# Patient Record
Sex: MALE
[De-identification: names, ages, dates, MRNs, and addresses within clinical notes are randomized per-mention and may not be internally consistent; named-entity substitution may affect disease eponyms.]

---

## 2023-06-12 ENCOUNTER — APPOINTMENT (OUTPATIENT)
Dept: UROLOGY | Facility: CLINIC | Age: 33
End: 2023-06-12
Payer: COMMERCIAL

## 2023-06-12 VITALS
SYSTOLIC BLOOD PRESSURE: 122 MMHG | OXYGEN SATURATION: 95 % | BODY MASS INDEX: 35.89 KG/M2 | WEIGHT: 265 LBS | HEART RATE: 88 BPM | TEMPERATURE: 98.4 F | DIASTOLIC BLOOD PRESSURE: 85 MMHG | RESPIRATION RATE: 16 BRPM | HEIGHT: 72 IN

## 2023-06-12 DIAGNOSIS — N50.811 RIGHT TESTICULAR PAIN: ICD-10-CM

## 2023-06-12 PROBLEM — Z00.00 ENCOUNTER FOR PREVENTIVE HEALTH EXAMINATION: Status: ACTIVE | Noted: 2023-06-12

## 2023-06-12 PROCEDURE — 99204 OFFICE O/P NEW MOD 45 MIN: CPT

## 2023-06-12 NOTE — HISTORY OF PRESENT ILLNESS
[FreeTextEntry1] : Language: English\par Date of First visit: 06/12/2023 \par Accompanied by: self\par Contact info: \par Referring Provider/PCP: Dr. Mirna Ortiz ([facility])\par Fax: [phone]\par \par \par CC/ Problem List:\par right testicle discomfort\par \par ===============================================================================\par FIRST VISIT / Summary:\par Very pleasant 32 year old M here for mild discomfort to Right testicle that began about 2-3 months ago. He feels as though he has been hit. Rates 4/10. No alleviating factors. Sexual relations exacerbate. Feels small lump to right side not sure how its been present. No imaging performed. \par \par Denies dysuria, hematuria, discharge, pain with heavy lifting, or change in urination\par \par -------------------------------------------------------------------------------------------\par INTERVAL VISITS:\par \par ===============================================================================\par \par PMH: mild HLD no medications\par Meds: denies\par All: Seasonal allergies. Denies food or medication\par FHx: paternal grandfather w/melanoma; mother with thyroid CA s/p tx and alive and well\par SocHx: quit in 2015 use to smoke 1ppk/week x6yrs, social ETOH, unemployed, single, no children\par \par PSH: tonsillectomy, wisdom teeth \par \par \par ROS: Review of Systems is as per HPI unless otherwise denoted below\par \par \par ===============================================================================\par DATA: \par \par LABS (SELECTED):-------------------------------------------------------------------------------------------------\par 5/16/2023 - UA negative, no cx done. HIV, G/C, RPR, Hepatitis all negative \par \par RADS:-------------------------------------------------------------------------------------------------------------------\par \par \par PATHOLOGY/CYTOLOGY:-------------------------------------------------------------------------------------------\par \par \par VOIDING STUDIES: ----------------------------------------------------------------------------------------------------\par \par \par STONE STUDIES: (Analysis/LLSA)----------------------------------------------------------------------------------\par \par \par PROCEDURES: -----------------------------------------------------------------------------------------------\par \par \par \par \par ===============================================================================\par \par PHYSICAL EXAM:\par \par GEN: AAOx3, NAD\par \par PSYCH: Appropriate Behavior, Affect Congruent\par \par Lungs: No labored breathing\par \par GAIT: Gait normal, Stability good\par \par ABD: no suprapubic or CVAT\par \par \par  FOCUSED: ----------------------------------------------------------------------------------------------------------------\par \par \par =======================================================================================\par DISCUSSION: \par =======================================================================================\par ASSESSMENT and PLAN\par \par \par 1. Right testicular pain\par -NSAID\par -Advised abstinence from sexual relations\par -Advised supportive undergarments boxer briefs\par -STI panel negative as above\par \par 2. Right testicular mass\par -Scrotal US\par \par =======================================================================================\par \par Thank you for allowing me to assist in the care of your patient. Should you have any questions please do not hesitate to reach out to me.\par \par \par Trav Weiner MD                                                            \par [facility]\par [facility]\par \par [place] Office:\par [address], [address]   \[place] [address]    \par T: [phone]    \par F: [phone]    \par \par [place] Office:\par [address] [place] [address], [address]st floor\[place] [address]\par T: [phone]\par F: [phone]

## 2023-06-14 LAB
APPEARANCE: CLEAR
BACTERIA: NEGATIVE /HPF
BILIRUBIN URINE: NEGATIVE
BLOOD URINE: NEGATIVE
CAST: 0 /LPF
COLOR: YELLOW
EPITHELIAL CELLS: 0 /HPF
GLUCOSE QUALITATIVE U: NEGATIVE MG/DL
KETONES URINE: NEGATIVE MG/DL
LEUKOCYTE ESTERASE URINE: NEGATIVE
MICROSCOPIC-UA: NORMAL
NITRITE URINE: NEGATIVE
PH URINE: 7
PROTEIN URINE: NEGATIVE MG/DL
RED BLOOD CELLS URINE: 0 /HPF
SPECIFIC GRAVITY URINE: 1.02
UROBILINOGEN URINE: 1 MG/DL
WHITE BLOOD CELLS URINE: 0 /HPF

## 2023-06-18 LAB — BACTERIA UR CULT: NORMAL
